# Patient Record
Sex: MALE | Race: WHITE | NOT HISPANIC OR LATINO | Employment: UNEMPLOYED | URBAN - METROPOLITAN AREA
[De-identification: names, ages, dates, MRNs, and addresses within clinical notes are randomized per-mention and may not be internally consistent; named-entity substitution may affect disease eponyms.]

---

## 2024-07-30 ENCOUNTER — APPOINTMENT (OUTPATIENT)
Dept: RADIOLOGY | Facility: MEDICAL CENTER | Age: 59
End: 2024-07-30
Attending: STUDENT IN AN ORGANIZED HEALTH CARE EDUCATION/TRAINING PROGRAM

## 2024-07-30 ENCOUNTER — HOSPITAL ENCOUNTER (EMERGENCY)
Facility: MEDICAL CENTER | Age: 59
End: 2024-07-30
Attending: STUDENT IN AN ORGANIZED HEALTH CARE EDUCATION/TRAINING PROGRAM

## 2024-07-30 VITALS
BODY MASS INDEX: 31.99 KG/M2 | SYSTOLIC BLOOD PRESSURE: 125 MMHG | WEIGHT: 192 LBS | RESPIRATION RATE: 12 BRPM | DIASTOLIC BLOOD PRESSURE: 82 MMHG | OXYGEN SATURATION: 93 % | HEART RATE: 100 BPM | HEIGHT: 65 IN | TEMPERATURE: 99.6 F

## 2024-07-30 DIAGNOSIS — B18.2 CHRONIC HEPATITIS C WITHOUT HEPATIC COMA (HCC): ICD-10-CM

## 2024-07-30 DIAGNOSIS — D64.9 NORMOCYTIC ANEMIA: ICD-10-CM

## 2024-07-30 DIAGNOSIS — K70.31 ALCOHOLIC CIRRHOSIS OF LIVER WITH ASCITES (HCC): ICD-10-CM

## 2024-07-30 DIAGNOSIS — R05.1 ACUTE COUGH: ICD-10-CM

## 2024-07-30 DIAGNOSIS — R73.9 HYPERGLYCEMIA: ICD-10-CM

## 2024-07-30 LAB
ALBUMIN SERPL BCP-MCNC: 2.5 G/DL (ref 3.2–4.9)
ALBUMIN/GLOB SERPL: 0.7 G/DL
ALP SERPL-CCNC: 98 U/L (ref 30–99)
ALT SERPL-CCNC: 21 U/L (ref 2–50)
ANION GAP SERPL CALC-SCNC: 11 MMOL/L (ref 7–16)
AST SERPL-CCNC: 39 U/L (ref 12–45)
BASOPHILS # BLD AUTO: 0.9 % (ref 0–1.8)
BASOPHILS # BLD: 0.09 K/UL (ref 0–0.12)
BILIRUB SERPL-MCNC: 2 MG/DL (ref 0.1–1.5)
BUN SERPL-MCNC: 15 MG/DL (ref 8–22)
CALCIUM ALBUM COR SERPL-MCNC: 9.8 MG/DL (ref 8.5–10.5)
CALCIUM SERPL-MCNC: 8.6 MG/DL (ref 8.5–10.5)
CHLORIDE SERPL-SCNC: 102 MMOL/L (ref 96–112)
CO2 SERPL-SCNC: 18 MMOL/L (ref 20–33)
CREAT SERPL-MCNC: 0.73 MG/DL (ref 0.5–1.4)
EKG IMPRESSION: NORMAL
EOSINOPHIL # BLD AUTO: 0.17 K/UL (ref 0–0.51)
EOSINOPHIL NFR BLD: 1.6 % (ref 0–6.9)
ERYTHROCYTE [DISTWIDTH] IN BLOOD BY AUTOMATED COUNT: 49 FL (ref 35.9–50)
FLUAV RNA SPEC QL NAA+PROBE: NEGATIVE
FLUBV RNA SPEC QL NAA+PROBE: NEGATIVE
GFR SERPLBLD CREATININE-BSD FMLA CKD-EPI: 106 ML/MIN/1.73 M 2
GLOBULIN SER CALC-MCNC: 3.8 G/DL (ref 1.9–3.5)
GLUCOSE SERPL-MCNC: 110 MG/DL (ref 65–99)
HCT VFR BLD AUTO: 34.8 % (ref 42–52)
HGB BLD-MCNC: 12.3 G/DL (ref 14–18)
IMM GRANULOCYTES # BLD AUTO: 0.04 K/UL (ref 0–0.11)
IMM GRANULOCYTES NFR BLD AUTO: 0.4 % (ref 0–0.9)
LYMPHOCYTES # BLD AUTO: 2.49 K/UL (ref 1–4.8)
LYMPHOCYTES NFR BLD: 24.1 % (ref 22–41)
MCH RBC QN AUTO: 33.5 PG (ref 27–33)
MCHC RBC AUTO-ENTMCNC: 35.3 G/DL (ref 32.3–36.5)
MCV RBC AUTO: 94.8 FL (ref 81.4–97.8)
MONOCYTES # BLD AUTO: 1.39 K/UL (ref 0–0.85)
MONOCYTES NFR BLD AUTO: 13.4 % (ref 0–13.4)
NEUTROPHILS # BLD AUTO: 6.16 K/UL (ref 1.82–7.42)
NEUTROPHILS NFR BLD: 59.6 % (ref 44–72)
NRBC # BLD AUTO: 0 K/UL
NRBC BLD-RTO: 0 /100 WBC (ref 0–0.2)
PLATELET # BLD AUTO: 220 K/UL (ref 164–446)
PMV BLD AUTO: 8.6 FL (ref 9–12.9)
POTASSIUM SERPL-SCNC: 4.5 MMOL/L (ref 3.6–5.5)
PROT SERPL-MCNC: 6.3 G/DL (ref 6–8.2)
RBC # BLD AUTO: 3.67 M/UL (ref 4.7–6.1)
RSV RNA SPEC QL NAA+PROBE: NEGATIVE
SARS-COV-2 RNA RESP QL NAA+PROBE: NOTDETECTED
SODIUM SERPL-SCNC: 131 MMOL/L (ref 135–145)
TROPONIN T SERPL-MCNC: 7 NG/L (ref 6–19)
WBC # BLD AUTO: 10.3 K/UL (ref 4.8–10.8)

## 2024-07-30 PROCEDURE — 85025 COMPLETE CBC W/AUTO DIFF WBC: CPT

## 2024-07-30 PROCEDURE — 71045 X-RAY EXAM CHEST 1 VIEW: CPT

## 2024-07-30 PROCEDURE — 700111 HCHG RX REV CODE 636 W/ 250 OVERRIDE (IP): Performed by: STUDENT IN AN ORGANIZED HEALTH CARE EDUCATION/TRAINING PROGRAM

## 2024-07-30 PROCEDURE — 84484 ASSAY OF TROPONIN QUANT: CPT

## 2024-07-30 PROCEDURE — A9270 NON-COVERED ITEM OR SERVICE: HCPCS | Performed by: STUDENT IN AN ORGANIZED HEALTH CARE EDUCATION/TRAINING PROGRAM

## 2024-07-30 PROCEDURE — 700102 HCHG RX REV CODE 250 W/ 637 OVERRIDE(OP): Performed by: STUDENT IN AN ORGANIZED HEALTH CARE EDUCATION/TRAINING PROGRAM

## 2024-07-30 PROCEDURE — 36415 COLL VENOUS BLD VENIPUNCTURE: CPT

## 2024-07-30 PROCEDURE — 0241U HCHG SARS-COV-2 COVID-19 NFCT DS RESP RNA 4 TRGT ED POC: CPT

## 2024-07-30 PROCEDURE — 80053 COMPREHEN METABOLIC PANEL: CPT

## 2024-07-30 PROCEDURE — 93005 ELECTROCARDIOGRAM TRACING: CPT | Performed by: STUDENT IN AN ORGANIZED HEALTH CARE EDUCATION/TRAINING PROGRAM

## 2024-07-30 PROCEDURE — 93005 ELECTROCARDIOGRAM TRACING: CPT

## 2024-07-30 PROCEDURE — 99285 EMERGENCY DEPT VISIT HI MDM: CPT

## 2024-07-30 RX ORDER — MECLIZINE HYDROCHLORIDE 25 MG/1
25 TABLET ORAL ONCE
Status: COMPLETED | OUTPATIENT
Start: 2024-07-30 | End: 2024-07-30

## 2024-07-30 RX ORDER — ONDANSETRON 4 MG/1
4 TABLET, ORALLY DISINTEGRATING ORAL ONCE
Status: COMPLETED | OUTPATIENT
Start: 2024-07-30 | End: 2024-07-30

## 2024-07-30 RX ORDER — ONDANSETRON 4 MG/1
4 TABLET, ORALLY DISINTEGRATING ORAL EVERY 6 HOURS PRN
Qty: 10 TABLET | Refills: 0 | Status: SHIPPED | OUTPATIENT
Start: 2024-07-30

## 2024-07-30 RX ORDER — FUROSEMIDE 20 MG
20 TABLET ORAL DAILY
Qty: 30 TABLET | Refills: 0 | Status: SHIPPED | OUTPATIENT
Start: 2024-07-30

## 2024-07-30 RX ORDER — ACETAMINOPHEN 325 MG/1
650 TABLET ORAL EVERY 4 HOURS PRN
Qty: 30 TABLET | Refills: 0 | Status: SHIPPED | OUTPATIENT
Start: 2024-07-30

## 2024-07-30 RX ORDER — FUROSEMIDE 40 MG
20 TABLET ORAL
Status: DISCONTINUED | OUTPATIENT
Start: 2024-07-30 | End: 2024-07-30 | Stop reason: HOSPADM

## 2024-07-30 RX ORDER — FUROSEMIDE 40 MG
20 TABLET ORAL
Status: DISCONTINUED | OUTPATIENT
Start: 2024-07-31 | End: 2024-07-30

## 2024-07-30 RX ADMIN — FUROSEMIDE 20 MG: 40 TABLET ORAL at 17:57

## 2024-07-30 RX ADMIN — MECLIZINE HYDROCHLORIDE 25 MG: 25 TABLET ORAL at 17:57

## 2024-07-30 RX ADMIN — ONDANSETRON 4 MG: 4 TABLET, ORALLY DISINTEGRATING ORAL at 17:55

## 2024-07-30 ASSESSMENT — PAIN DESCRIPTION - PAIN TYPE: TYPE: ACUTE PAIN

## 2024-07-30 NOTE — ED TRIAGE NOTES
"Chief Complaint   Patient presents with    Shortness of Breath     Pt presents with SOB and dizziness. Pt also endorses abdominal swelling. Pt has hx of stage 4 liver failure.     Abdominal Swelling       Pt wheeled to triage. Pt A&Ox4, for above complaint.     Pt to lobby . Pt educated on alerting staff in changes to condition. Pt verbalized understanding. Protocol SOB, ECG    /76   Pulse (!) 113   Temp 36.4 °C (97.5 °F) (Temporal)   Resp 15   Ht 1.651 m (5' 5\")   Wt 87.1 kg (192 lb)   SpO2 94%   BMI 31.95 kg/m²     "

## 2024-07-31 NOTE — ED PROVIDER NOTES
"ER Provider Note    Scribed for Tad Helm D.o. by Angie Villarreal. 7/30/2024  5:32 PM    Primary Care Provider: No primary care provider   CHIEF COMPLAINT   Chief Complaint   Patient presents with    Shortness of Breath     Pt presents with SOB and dizziness. Pt also endorses abdominal swelling. Pt has hx of stage 4 liver failure.     Abdominal Swelling     EXTERNAL RECORDS REVIEWED  No records available to review.    HPI/ROS  LIMITATION TO HISTORY   Select: : None  OUTSIDE HISTORIAN(S):  None    Mason Linares is a 59 y.o. male who presents to the ED complaining of swelling onset for more than four days. Patient reports he is coming in today for \"a little bit of everything\". He reports a history of cirrhosis either from heavy alcohol use or from hepatits C. He reports he cannot breathe easily, has a cough, and feels dizzy. He also reports abdominal swelling and lower extremity swelling. He notes being in [place] four days ago where they drained fluid, he notes they left the wound open and it had residual leaking. He notes he is from [place], and was in [place] visiting his brother and returning to [place]. He notes vomiting two times. He reports some red blood in his stools, which he had in the past for Crohn's disease. He denies taking any daily medications. He denies feeling like this before. Patient adds he has been staying in his car, but hopes to be able to make it back to [place] tomorrow which he lives by hisself or by his sister.     PAST MEDICAL HISTORY  Past Medical History:   Diagnosis Date    Liver disease        SURGICAL HISTORY  History reviewed. No pertinent surgical history.    FAMILY HISTORY  History reviewed. No pertinent family history.    SOCIAL HISTORY   reports that he has been smoking cigarettes. He started smoking about 34 years ago. He has a 17.3 pack-year smoking history. He has never used smokeless tobacco. He reports that he does not currently use alcohol. He reports that he does not " "currently use drugs.    CURRENT MEDICATIONS  None noted     ALLERGIES  Patient has no known allergies.    PHYSICAL EXAM  /76   Pulse (!) 113   Temp 36.4 °C (97.5 °F) (Temporal)   Resp 15   Ht 1.651 m (5' 5\")   Wt 87.1 kg (192 lb)   SpO2 94%   BMI 31.95 kg/m²   Constitutional: Awake and alert. No acute distress.  Head: NCAT.  HEENT: Normal Conjunctiva. PERRLA.  Neck: Grossly normal range of motion. Airway midline.  Cardiovascular: Normal heart rate, Normal rhythm.  Thorax & Lungs: No respiratory distress. Clear to Auscultation bilaterally.  Abdomen: Normal inspection. Distended abdomen with ascites. No tenderness.  Skin: No obvious rash.  Back: No tenderness, No CVA tenderness.   Musculoskeletal: 2+ pitting edema to bilateral thighs.  No obvious deformity. Moves all extremities Well.  Neurologic: A&Ox3.   Psychiatric: Mood and affect are appropriate for situation.     DIAGNOSTIC STUDIES    EKG/LABS  Results for orders placed or performed during the hospital encounter of 07/30/24   CBC with Differential   Result Value Ref Range    WBC 10.3 4.8 - 10.8 K/uL    RBC 3.67 (L) 4.70 - 6.10 M/uL    Hemoglobin 12.3 (L) 14.0 - 18.0 g/dL    Hematocrit 34.8 (L) 42.0 - 52.0 %    MCV 94.8 81.4 - 97.8 fL    MCH 33.5 (H) 27.0 - 33.0 pg    MCHC 35.3 32.3 - 36.5 g/dL    RDW 49.0 35.9 - 50.0 fL    Platelet Count 220 164 - 446 K/uL    MPV 8.6 (L) 9.0 - 12.9 fL    Neutrophils-Polys 59.60 44.00 - 72.00 %    Lymphocytes 24.10 22.00 - 41.00 %    Monocytes 13.40 0.00 - 13.40 %    Eosinophils 1.60 0.00 - 6.90 %    Basophils 0.90 0.00 - 1.80 %    Immature Granulocytes 0.40 0.00 - 0.90 %    Nucleated RBC 0.00 0.00 - 0.20 /100 WBC    Neutrophils (Absolute) 6.16 1.82 - 7.42 K/uL    Lymphs (Absolute) 2.49 1.00 - 4.80 K/uL    Monos (Absolute) 1.39 (H) 0.00 - 0.85 K/uL    Eos (Absolute) 0.17 0.00 - 0.51 K/uL    Baso (Absolute) 0.09 0.00 - 0.12 K/uL    Immature Granulocytes (abs) 0.04 0.00 - 0.11 K/uL    NRBC (Absolute) 0.00 K/uL   Comp " Metabolic Panel   Result Value Ref Range    Sodium 131 (L) 135 - 145 mmol/L    Potassium 4.5 3.6 - 5.5 mmol/L    Chloride 102 96 - 112 mmol/L    Co2 18 (L) 20 - 33 mmol/L    Anion Gap 11.0 7.0 - 16.0    Glucose 110 (H) 65 - 99 mg/dL    Bun 15 8 - 22 mg/dL    Creatinine 0.73 0.50 - 1.40 mg/dL    Calcium 8.6 8.5 - 10.5 mg/dL    Correct Calcium 9.8 8.5 - 10.5 mg/dL    AST(SGOT) 39 12 - 45 U/L    ALT(SGPT) 21 2 - 50 U/L    Alkaline Phosphatase 98 30 - 99 U/L    Total Bilirubin 2.0 (H) 0.1 - 1.5 mg/dL    Albumin 2.5 (L) 3.2 - 4.9 g/dL    Total Protein 6.3 6.0 - 8.2 g/dL    Globulin 3.8 (H) 1.9 - 3.5 g/dL    A-G Ratio 0.7 g/dL   ESTIMATED GFR   Result Value Ref Range    GFR (CKD-EPI) 106 >60 mL/min/1.73 m 2   TROPONIN   Result Value Ref Range    Troponin T 7 6 - 19 ng/L   EKG   Result Value Ref Range    Report       [facility]    Test Date:  2024  Pt Name:    DAMIAN TRUJILLO                Department: ER  MRN:        [id number]                      Room:  Gender:     Male                         Technician: [id number]  :        [id number]                   Requested By:ER TRIAGE PROTOCOL  Order #:    [id number]                    Reading MD:    Measurements  Intervals                                Axis  Rate:       109                          P:          56  NV:         154                          QRS:        8  QRSD:       91                           T:          36  QT:         346  QTc:        467    Interpretive Statements  Sinus tachycardia  Low voltage, precordial leads  Anteroseptal infarct, old  No previous ECG available for comparison     POC CoV-2, FLU A/B, RSV by PCR   Result Value Ref Range    POC Influenza A RNA, PCR Negative Negative    POC Influenza B RNA, PCR Negative Negative    POC RSV, by PCR Negative Negative    POC SARS-CoV-2, PCR NotDetected NotDetected    I have independently interpreted this EKG    RADIOLOGY/PROCEDURES   The attending emergency physician has  independently interpreted the diagnostic imaging associated with this visit and am waiting the final reading from the radiologist.   My preliminary interpretation is a follows: Evidence of low lung volume    Radiologist interpretation:  DX-CHEST-PORTABLE (1 VIEW)   Final Result         1. Low lung volume with hypoventilatory change .          COURSE & MEDICAL DECISION MAKING     ASSESSMENT, COURSE AND PLAN  Care Narrative:     5:40 PM - Patient was evaluated at bedside; Patient is a 59 y.o. male who presents for evaluation of shortness of breath, vomiting, diarrhea, and generalized pain. Patient has a history of either alcoholic cirrhosis or 2/2 hepatits C. Exam reveals 2+ pitting edema to bilateral thighs, distended abdomen. No respiratory distress. Discussed with patient that labs, imaging, and EKG were ordered to further evaluate. Expressed patient's need for visit with his primary care to be prescribed medication for fluid control, advised on temporary medication here. Labs, imaging, and EKG ordered, as noted above. The patient was medicated with Lasix 20 mg, Antivert 25 mg, and Zofran 4 mg for their symptoms.  Patient agrees to the plan of care.   Differential Diagnoses:  Differential diagnoses include but are not limited to Ascites,SBP, ACS, Pneumonia, Viral illness, PE     7:21 PM - The patient informs me their symptoms feels improved following medication administration. I discussed the patient's diagnostic study results which are consistent with cirrhosis of liver with ascites.  Low suspicion for SBP currently with patient's primary complaint being shortness of breath.  I did offer a therapeutic paracentesis and patient declined.  He was informed he may need frequent paracentesis as the last one he had was 4 days ago.  Advised on patient's need to meet with his primary provider for prescription medication and care. Noted the patient's exam and vitals at this time are reassuring. Discussed plan for discharge;  I advised the patient to return to the [facility] ED with any new or worsening symptoms. Patient was given the opportunity to ask any questions. I addressed all questions or concerns and the patient is stable for discharge at this time. Patient verbalizes understanding and agreement to this plan of care.             ADDITIONAL PROBLEM LIST    SOB  Cirrhosis with ascites  Normocytic anemia      DISPOSITION AND DISCUSSIONS  I have discussed management of the patient with the following physicians and JT's:  None    Discussion of management with other QHP or appropriate source(s): None     Escalation of care considered, and ultimately not performed: acute inpatient care management, however at this time, the patient is most appropriate for outpatient management.    Barriers to care at this time, including but not limited to:  Patient is not from [place]. Patient currently staying in his car. Homless, lacks financial resources  .     Decision tools and prescription drugs considered including, but not limited to: Lasix medication. Pain medication Tylenol and Ibuprofen.    DISPOSITION:  Patient will be discharged home in stable condition.    FOLLOW UP:  No follow-up provider specified.    OUTPATIENT MEDICATIONS:  Discharge Medication List as of 7/30/2024  7:29 PM        START taking these medications    Details   furosemide (LASIX) 20 MG Tab Take 1 Tablet by mouth every day., Disp-30 Tablet, R-0, Normal      acetaminophen (TYLENOL) 325 MG Tab Take 2 Tablets by mouth every four hours as needed for Moderate Pain., Disp-30 Tablet, R-0, Normal      ondansetron (ZOFRAN ODT) 4 MG TABLET DISPERSIBLE Take 1 Tablet by mouth every 6 hours as needed for Nausea/Vomiting., Disp-10 Tablet, R-0, Normal              FINAL DIAGNOSIS  1. Alcoholic cirrhosis of liver with ascites (HCC) Chronic   2. Acute cough Acute   3. Normocytic anemia Chronic   4. Hyperglycemia Acute   5. Chronic hepatitis C without hepatic coma (HCC) Chronic      IAngie  (Scribe), am scribing for, and in the presence of, Tad Helm D.O..    Electronically signed by: Angie Villarreal (Scribe), 7/30/2024    Tad POZO D.O. personally performed the services described in this documentation, as scribed by Angie Villarreal in my presence, and it is both accurate and complete.      The note accurately reflects work and decisions made by me.  Tad Helm D.O.  7/30/2024  7:54 PM

## 2024-07-31 NOTE — ED NOTES
Pt ambulated to room 67 with steady gait. Pt placed on monitors. Chart up for ERP. Pt reports paracentesis 4 days ago.

## 2024-07-31 NOTE — ED NOTES
Patient has been provided written and verbal education on liver failure. 3 prescriptions sent to [facility] pharmacy. He verbalized understanding of were to obtain those medications. He is ambulatory on DC.

## 2024-07-31 NOTE — DISCHARGE INSTRUCTIONS
Take the Lasix as prescribed to help reduce the amount of swelling you are having.  You likely will need to have the fluid drained again in the near future.  If you can make it back to [place] first we can get your care started there and these will likely need to be scheduled.  If you are unable to leave [place] he can present back here for reevaluation.